# Patient Record
Sex: FEMALE | Race: WHITE | NOT HISPANIC OR LATINO | URBAN - METROPOLITAN AREA
[De-identification: names, ages, dates, MRNs, and addresses within clinical notes are randomized per-mention and may not be internally consistent; named-entity substitution may affect disease eponyms.]

---

## 2019-04-19 ENCOUNTER — IMPORTED ENCOUNTER (OUTPATIENT)
Dept: URBAN - METROPOLITAN AREA CLINIC 1 | Facility: CLINIC | Age: 39
End: 2019-04-19

## 2019-04-19 PROBLEM — H18.832: Noted: 2019-04-19

## 2019-04-19 PROCEDURE — 92002 INTRM OPH EXAM NEW PATIENT: CPT

## 2019-04-19 NOTE — PATIENT DISCUSSION
1.  RES OS: Recommend routine use of ATs TID OS. Recommend gel ATs OS QHS (Sample of Celluvisc). If not resolved recommend use kuldeep ATs QHS OU (Handout given). Consider laser therapy with no resolution. Recommend RTC with any reoccurence. Return for an appointment PRN with Dr. Glynn Walton.

## 2019-04-24 ENCOUNTER — IMPORTED ENCOUNTER (OUTPATIENT)
Dept: URBAN - METROPOLITAN AREA CLINIC 1 | Facility: CLINIC | Age: 39
End: 2019-04-24

## 2019-04-24 PROBLEM — S05.02XA: Noted: 2019-04-24

## 2019-04-24 PROBLEM — H18.832: Noted: 2019-04-24

## 2019-04-24 PROCEDURE — 92012 INTRM OPH EXAM EST PATIENT: CPT

## 2019-04-24 PROCEDURE — 92071 CONTACT LENS FITTING FOR TX: CPT

## 2019-05-07 ENCOUNTER — IMPORTED ENCOUNTER (OUTPATIENT)
Dept: URBAN - METROPOLITAN AREA CLINIC 1 | Facility: CLINIC | Age: 39
End: 2019-05-07

## 2019-05-07 PROBLEM — H18.822: Noted: 2019-05-07

## 2019-05-07 PROCEDURE — 92012 INTRM OPH EXAM EST PATIENT: CPT

## 2019-05-07 NOTE — PATIENT DISCUSSION
Corneal Abrasion OS (Active) : Active Corneal Abrasion OS w/ h/o RES OS -- Bandage CTL was removed today OS by RBF in office. Recommend continue the frequent use of OTC PF ATS [id number]-[id number] OS Routinely. **Consider laser therapy w/ no resolution. Discussed this option with patient today. Return as scheduled tomorrow 5/8/19 for 10 appointment with Dr. Rodney Eric.

## 2019-05-07 NOTE — PATIENT DISCUSSION
1.  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 2. Active Corneal Abrasion OS w/ h/o RES OS -- Resolving. Bandage CTL was removed today OS by RBF in office. Recommend continue the use of PF AT's Q1-2hrs OS. **Consider laser therapy w/ no resolution. Discussed this option with patient today. Return for an appointment as needed with Dr. Shahida English.

## 2019-06-11 ENCOUNTER — IMPORTED ENCOUNTER (OUTPATIENT)
Dept: URBAN - METROPOLITAN AREA CLINIC 1 | Facility: CLINIC | Age: 39
End: 2019-06-11

## 2019-06-11 PROBLEM — S05.02XA: Noted: 2019-06-11

## 2019-06-11 PROCEDURE — 92012 INTRM OPH EXAM EST PATIENT: CPT

## 2019-06-11 NOTE — PATIENT DISCUSSION
1.  Active Corneal Abrasion OS w/ h/o RES OS -- PMG saw today. Resolving Abrasion. Reoccuring every 3-4 days despite routine use of ATs kuldeep QHS OS. Patient admits to not any ATs during the day routinely. Stressed compliance with ATs Q1-2hrs throughout the day. Discussed therapy option with laser if no resolution with compliance. (Sample of both Refresh Liquigel and Refresh Advanced Optive PF given)2. H/o injury OS with corneal membrane separation (March 2013)3. 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 Return for an appointment in PRN with Dr. Manuel Campoverde.

## 2019-06-13 ENCOUNTER — IMPORTED ENCOUNTER (OUTPATIENT)
Dept: URBAN - METROPOLITAN AREA CLINIC 1 | Facility: CLINIC | Age: 39
End: 2019-06-13

## 2019-06-13 PROBLEM — H18.832: Noted: 2019-06-13

## 2019-06-13 PROCEDURE — 92012 INTRM OPH EXAM EST PATIENT: CPT

## 2019-06-13 NOTE — PATIENT DISCUSSION
1.  RES OS w/ H/o K Abrasion OS -- Silicone Plug LLL:  Risks benefits and alternatives to placing plug was discussed with patient. Patient understands and would like to proceed. Consent was signed. Post op instructions were discussed/given to patient and patient was advised to call our office if any problems arose. Place LLL Plug in hopes of improvement. Patient to continue current gtts regimen; ATs Q1-2hrs throughout the day and ATs kuldeep QHS OS. Advise patient to return if still symptomatic despite compliance and LLL plug. 2.  H/o injury OS with corneal membrane separation (March 2013)3. 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 Return for an appointment in 6 months for a 10 / k check with Dr. Danyelle Bojorquez.

## 2019-06-18 ENCOUNTER — IMPORTED ENCOUNTER (OUTPATIENT)
Dept: URBAN - METROPOLITAN AREA CLINIC 1 | Facility: CLINIC | Age: 39
End: 2019-06-18

## 2019-06-18 PROBLEM — H18.832: Noted: 2019-06-18

## 2019-06-18 PROCEDURE — 92012 INTRM OPH EXAM EST PATIENT: CPT

## 2019-06-18 NOTE — PATIENT DISCUSSION
1. RES OS- active. Patient has had multiple recurrences despite ATs lubricating Kuldeep QHS and throughout the night. Patient has been compliant. Punctal plug was placed last visit with recurrent s/p placement. H/o Plug LLL. Continue ATs Q1-2hrs throughout the day w/a continue ATs kuldeep QHS OS. Recommend [address] OS given conservative management has not resolved issue OS. 2.  H/o injury OS with corneal membrane separation (March 2013)3. 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 *** Schedule PTK OS

## 2019-06-21 ENCOUNTER — IMPORTED ENCOUNTER (OUTPATIENT)
Dept: URBAN - METROPOLITAN AREA CLINIC 1 | Facility: CLINIC | Age: 39
End: 2019-06-21

## 2019-06-21 PROBLEM — H18.832: Noted: 2019-06-21

## 2019-06-21 PROCEDURE — 92012 INTRM OPH EXAM EST PATIENT: CPT

## 2019-06-21 NOTE — PATIENT DISCUSSION
1. RES OS- active. Patient has had multiple recurrences despite ATs lubricating Kuldeep QHS and throughout the night. Patient has been compliant. Punctal plug was placed last visit with recurrent s/p placement. H/o Plug LLL. Continue ATs Q1-2hrs throughout the day w/a continue ATs kuldeep QHS OS. Recommend [address] OS given conservative management has not resolved issue OS. 2.  H/o injury OS with corneal membrane separation (March 2013)3. 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 *** Schedule PTK OS Awaiting Workman's Comp to approve [address] OS

## 2019-06-25 ENCOUNTER — IMPORTED ENCOUNTER (OUTPATIENT)
Dept: URBAN - METROPOLITAN AREA CLINIC 1 | Facility: CLINIC | Age: 39
End: 2019-06-25

## 2019-06-25 PROCEDURE — 92012 INTRM OPH EXAM EST PATIENT: CPT

## 2019-06-25 NOTE — PATIENT DISCUSSION
1.  RES OS- active. Patient has had multiple recurrences despite ATs lubricating Kuldeep QHS and throughout the night. Patient has been compliant. Punctal plug was placed last visit with recurrence s/p placement. H/o Plug LLL. Continue ATs Q1-2hrs throughout the day w/a continue ATs kuldeep QHS OS. Recommend [address] OS given conservative management has not resolved issue OS. 2.  H/o injury OS with corneal membrane separation (March 2013)3. 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 *** Schedule PTK OS Awaiting Workman's Comp to approve [address] OS

## 2019-07-01 PROBLEM — H18.832: Noted: 2019-07-01

## 2019-07-29 ENCOUNTER — IMPORTED ENCOUNTER (OUTPATIENT)
Dept: URBAN - METROPOLITAN AREA CLINIC 1 | Facility: CLINIC | Age: 39
End: 2019-07-29

## 2019-07-29 NOTE — PATIENT DISCUSSION
Praneeth Quan for patient to proceed with PTK OS. Written rx given for Valium Ocuflox BID OS Durezol BID OSReturn for an appointment for Return as scheduled [address] OS with Dr. Colette Hudson.

## 2019-08-02 ENCOUNTER — IMPORTED ENCOUNTER (OUTPATIENT)
Dept: URBAN - METROPOLITAN AREA CLINIC 1 | Facility: CLINIC | Age: 39
End: 2019-08-02

## 2019-08-05 ENCOUNTER — IMPORTED ENCOUNTER (OUTPATIENT)
Dept: URBAN - METROPOLITAN AREA CLINIC 1 | Facility: CLINIC | Age: 39
End: 2019-08-05

## 2019-08-05 PROBLEM — Z98.89: Noted: 2019-08-05

## 2019-08-05 NOTE — PATIENT DISCUSSION
POD #3 PTK OS - Doing Well. BCL in place will wait to remove Duezol BID OSOcuflox BID OSTears QID OSReturn for an appointment in wed/thurs po with Dr. Ashlyn Burgos.  (BCL removal)

## 2019-08-07 ENCOUNTER — IMPORTED ENCOUNTER (OUTPATIENT)
Dept: URBAN - METROPOLITAN AREA CLINIC 1 | Facility: CLINIC | Age: 39
End: 2019-08-07

## 2019-08-07 PROBLEM — Z98.89: Noted: 2019-08-07

## 2019-08-07 NOTE — PATIENT DISCUSSION
POD #5 PTK OS - Doing Well. BCL removed OS. Epithelium healing well to center Duezol BID OSOcuflox BID OSTears QID OSReturn for an appointment in 2-3 weeks po with Dr. Terrance Small.

## 2019-08-20 ENCOUNTER — IMPORTED ENCOUNTER (OUTPATIENT)
Dept: URBAN - METROPOLITAN AREA CLINIC 1 | Facility: CLINIC | Age: 39
End: 2019-08-20

## 2019-08-20 PROBLEM — G43.909: Noted: 2019-08-20

## 2019-08-20 PROCEDURE — 92012 INTRM OPH EXAM EST PATIENT: CPT

## 2019-08-20 NOTE — PATIENT DISCUSSION
1.  Ocular Migraine- Reassurance and explanation was given to patient. 2. POW #3 [address] OS - Doing Well. DC Ocuflox BID OS. Cont Durezol BID OS Tears Q2H OSReturn for an appointment for Return as scheduled po with Dr. Eli Corea.

## 2019-09-19 ENCOUNTER — IMPORTED ENCOUNTER (OUTPATIENT)
Dept: URBAN - METROPOLITAN AREA CLINIC 1 | Facility: CLINIC | Age: 39
End: 2019-09-19

## 2019-09-19 PROBLEM — Z09: Noted: 2019-09-19

## 2019-09-19 PROCEDURE — 99024 POSTOP FOLLOW-UP VISIT: CPT

## 2019-09-19 NOTE — PATIENT DISCUSSION
1.  PO PTK OS - doing well. No erosion OS noted. D/c Durezol OS. Increase ATs to Q3hrs OS w/a Use AT Gel kuldeep QHS OS. Return for an appointment in 1 month PO PTK (Repeat MRx OS) with Dr. Quita Bhatti.

## 2019-11-01 ENCOUNTER — IMPORTED ENCOUNTER (OUTPATIENT)
Dept: URBAN - METROPOLITAN AREA CLINIC 1 | Facility: CLINIC | Age: 39
End: 2019-11-01

## 2019-11-01 PROBLEM — Z09: Noted: 2019-11-01

## 2019-11-01 PROCEDURE — 99024 POSTOP FOLLOW-UP VISIT: CPT

## 2019-11-01 NOTE — PATIENT DISCUSSION
1. POM#3 PTK OS with h/o RES OS- doing well. No erosion OS noted. Continue ATs Q3hrs OU w/a. Use AT Gel kuldeep QHS OU. MRx for glasses given (requested by patient). Return for an appointment in 6 mo 30 with Dr. Iwona Mcnamara.

## 2019-12-04 ENCOUNTER — IMPORTED ENCOUNTER (OUTPATIENT)
Dept: URBAN - METROPOLITAN AREA CLINIC 1 | Facility: CLINIC | Age: 39
End: 2019-12-04

## 2019-12-04 PROBLEM — H18.832: Noted: 2019-12-04

## 2019-12-04 PROCEDURE — 92012 INTRM OPH EXAM EST PATIENT: CPT

## 2019-12-04 PROCEDURE — 92071 CONTACT LENS FITTING FOR TX: CPT

## 2019-12-04 NOTE — PATIENT DISCUSSION
1.  RES OS (Plug in Place LLL) -- Active despite s/p PTK OS. Bandage CTL was placed OS during exam today by RBF & Consent signed ([employer] Night & Day: -0.25 / 8.4). Begin Prolensa Qday-BID OS until gone (Sample Given). Patient has had multiple recurrences despite PTK ATs lubricating Kuldeep QHS and throughout the night. Patient has been compliant. Continue the use of OTC PF AT's [id number] OU routinely throughout the day w/a continue ATs kuldeep QHS OS. 2.  H/o Corneal Injury OS with corneal membrane separation (March 2013)3. S/p [address] OS Return for an appointment on Monday 12/9/19 for a 10 (K Check OS) with Dr. Shahida English.

## 2019-12-09 ENCOUNTER — IMPORTED ENCOUNTER (OUTPATIENT)
Dept: URBAN - METROPOLITAN AREA CLINIC 1 | Facility: CLINIC | Age: 39
End: 2019-12-09

## 2019-12-09 PROBLEM — H16.002: Noted: 2019-12-09

## 2019-12-09 PROBLEM — H18.832: Noted: 2019-12-09

## 2019-12-09 PROCEDURE — 92012 INTRM OPH EXAM EST PATIENT: CPT

## 2019-12-09 NOTE — PATIENT DISCUSSION
1.  Corneal ulcer OS: Removed BCL. Begin Besivance Q2H OS. Begin Eyrthromycin kuldeep QHS OS (erx both). Return immediately if ulcer larger or symptoms worsen. 2.  RES OS: (LLL plug in place) Re-Epithelialized. BCL in place removed in office. Recommend PF ATs Q1H OS. 3.  H/o Corneal Injury OS with corneal membrane separation (March 2013)4. S/p [address] [facility] for an appointment in Wednesday 10 with Dr. Michelle Correa.

## 2019-12-09 NOTE — PATIENT DISCUSSION
RES OS: (LLL plug in place) BCL in place removed in office. Recommend PF ATs Q1H OS and AT Darius QHS OS.

## 2019-12-11 ENCOUNTER — IMPORTED ENCOUNTER (OUTPATIENT)
Dept: URBAN - METROPOLITAN AREA CLINIC 1 | Facility: CLINIC | Age: 39
End: 2019-12-11

## 2019-12-11 PROBLEM — H16.002: Noted: 2019-12-11

## 2019-12-11 PROBLEM — H18.832: Noted: 2019-12-11

## 2019-12-11 PROCEDURE — 92012 INTRM OPH EXAM EST PATIENT: CPT

## 2019-12-13 ENCOUNTER — IMPORTED ENCOUNTER (OUTPATIENT)
Dept: URBAN - METROPOLITAN AREA CLINIC 1 | Facility: CLINIC | Age: 39
End: 2019-12-13

## 2019-12-13 PROBLEM — H17.822: Noted: 2019-12-13

## 2019-12-13 PROBLEM — H16.002: Noted: 2019-12-13

## 2019-12-13 PROCEDURE — 92012 INTRM OPH EXAM EST PATIENT: CPT

## 2019-12-13 NOTE — PATIENT DISCUSSION
1.  Corneal Ulcer OS -- Improving. Continue Besivance Q2H OS (samples x2 given) and continue Tobramycin Q2H. (alternating Besivance and Tobramycin gtt Q1h). Continue Eyrthromycin kuldeep QHS OS. Return immediately if ulcer larger or symptoms worsen. Patient to remain out of work until Monday. 2. Peripheral Corneal Scar OS -- Secondary to resolving Corneal Ulcer. 3.  RES OS -- (LLL plug in place) Re-Epithelialized. Cont PF ATs Q1H OS in between Besivance and Tobramycin. 4.  H/o Corneal Injury OS with corneal membrane separation (March 2013)5. S/p [address] [facility] for an appointment Tuesday for a 10 with Dr. Denisha Zabala.

## 2019-12-17 ENCOUNTER — IMPORTED ENCOUNTER (OUTPATIENT)
Dept: URBAN - METROPOLITAN AREA CLINIC 1 | Facility: CLINIC | Age: 39
End: 2019-12-17

## 2019-12-17 PROBLEM — H16.002: Noted: 2019-12-17

## 2019-12-17 PROCEDURE — 92012 INTRM OPH EXAM EST PATIENT: CPT

## 2019-12-17 NOTE — PATIENT DISCUSSION
1.  Corneal Ulcer OS -- Ulcer appears to be worsening despite compliance w/ gtts. Continue Besivance Q2H OS (sample given) and continue Tobramycin Q2H (alternating Besivance and Tobramycin gtt Q1h). Continue Eyrthromycin kuldeep QHS OS. Will refer patient to Corneal specialist (Dr. Marcelino Terry) to be seen within the week. 2.  RES OS -- (LLL plug in place) Re-Epithelialized. Cont PF ATs Q1H OS in between Besivance and Tobramycin. 3.  H/o Corneal Injury OS with corneal membrane separation (March)4. S/p PTK OS (August 2019)Return for an appointment as scheduled in May with Dr. Sanjana Iniguez.

## 2020-05-01 ENCOUNTER — IMPORTED ENCOUNTER (OUTPATIENT)
Dept: URBAN - METROPOLITAN AREA CLINIC 1 | Facility: CLINIC | Age: 40
End: 2020-05-01

## 2020-05-01 PROBLEM — H16.142: Noted: 2020-05-01

## 2020-05-01 PROCEDURE — 92012 INTRM OPH EXAM EST PATIENT: CPT

## 2020-05-01 NOTE — PATIENT DISCUSSION
1. Punctate Keratitis OS -- Non compliance with gtts. Recommend patient to use Genteal kuldeep QHS and ATs TID OS at minimum. Compliance urged. 2.  RES OS w/ h/o PTK (August 2019)  -- (LLL plug in place)3. K scar OS -- H/o Corneal Ulcer OS. Patient referred by Dr. Lynnette Tipton to see Dr. Laverna Kanner. Patient seen by Dr. Laverna Kanner December 2019 who recommended observation without any surgical intervention. 4.  H/o Corneal Injury OS with corneal membrane separation (March 2019)Return for an appointment 1 month for a 30 with Dr. Ramona Pruett.

## 2020-05-01 NOTE — PATIENT DISCUSSION
1.  PEK OS --  Recommend genteal kuldeep QHS and using ATs TID OS. Compliance urged. 2.  RES OS w/ h/o PTK (August 2019)  -- (LLL plug in place)  3.  K scar OS -- H/o Corneal Ulcer OS4. H/o Corneal Injury OS with corneal membrane separation (March)Return for an appointment 1 month for a 30 with Dr. Sofia Sanchez.

## 2020-05-19 ENCOUNTER — IMPORTED ENCOUNTER (OUTPATIENT)
Dept: URBAN - METROPOLITAN AREA CLINIC 1 | Facility: CLINIC | Age: 40
End: 2020-05-19

## 2020-05-19 PROBLEM — H16.142: Noted: 2020-05-19

## 2020-05-19 PROCEDURE — 92012 INTRM OPH EXAM EST PATIENT: CPT

## 2020-05-19 NOTE — PATIENT DISCUSSION
RES OS w/ h/o PTK (August 2019)  - (LLL plug in place)3. K scar OS - H/o Corneal Ulcer OS. Patient referred by Dr. Juaquin Middleton to see Dr. Minh Ellison. Patient seen by Dr. Minh Ellison December 2019 who recommended observation without any surgical intervention.  4.  H/o Corneal Injury OS with corneal membrane separation (March 2019)

## 2020-05-21 ENCOUNTER — IMPORTED ENCOUNTER (OUTPATIENT)
Dept: URBAN - METROPOLITAN AREA CLINIC 1 | Facility: CLINIC | Age: 40
End: 2020-05-21

## 2020-05-21 PROBLEM — S05.02XA: Noted: 2020-05-21

## 2020-05-21 PROBLEM — H18.832: Noted: 2020-05-21

## 2020-05-21 PROCEDURE — 92071 CONTACT LENS FITTING FOR TX: CPT

## 2020-05-21 PROCEDURE — 92012 INTRM OPH EXAM EST PATIENT: CPT

## 2020-05-21 NOTE — PATIENT DISCUSSION
1.  Active Corneal Abrasion OS --  H/o RES OS. Speculum was inserted and a bandage CTL placed OS by PMG today without complications. Begin Ofloxacin Q3h. Cont PF ATs Q1H OS routinely and advised to switch to AT Darius QHS OS. 2. RES OS w/ h/o PTK (August 2019)  -- (LLL plug in place)3. H/o Corneal Injury OS (Cable) with corneal membrane separation (March 2019)4. K scar OS -- H/o Corneal Ulcer OS. Patient seen by Dr. Luzmaria Starks December 2019 who recommended observation without any surgical intervention. Return for an appointment in Tuesday 10 with Dr. Liane Romero.

## 2020-05-26 ENCOUNTER — IMPORTED ENCOUNTER (OUTPATIENT)
Dept: URBAN - METROPOLITAN AREA CLINIC 1 | Facility: CLINIC | Age: 40
End: 2020-05-26

## 2020-05-26 PROBLEM — S05.02XD: Noted: 2020-05-26

## 2020-05-26 PROCEDURE — 99213 OFFICE O/P EST LOW 20 MIN: CPT

## 2020-05-26 NOTE — PATIENT DISCUSSION
H/o RES OS. Ofloxacin Q3h. Cont PF ATs Q1H OS routinely and advised to switch to AT Darius QHS OS. 2. RES OS w/ h/o PTK (August 2019) - (LLL plug in place)3. H/o Corneal Injury OS (Cable) with corneal membrane separation (March 2019)4. Peripheral Corneal scar OS - H/o Corneal Ulcer OS. Patient seen by Dr. Rodas Cord December 2019 who recommended observation without any surgical intervention.

## 2020-05-26 NOTE — PATIENT DISCUSSION
1.  Corneal Abrasion OS (Subsequent) : Much improved. BCL in place will remove at next visit. H/o RES OS. Decrease Ofloxacin BID OS. Cont PF ATs Q1H OS routinely and AT Darius QHS OS. 2. RES OS w/ h/o PTK (August 2019) - (LLL plug in place)3. H/o Corneal Injury OS (Cable) with corneal membrane separation (March 2019)4. Peripheral Corneal scar OS - H/o Corneal Ulcer OS. Patient seen by Dr. Combs Gains December 2019 who recommended observation without any surgical intervention. Return for an appointment in Thursday afternoon 10 (remove BCL OS) with Dr. Minh Starks.

## 2020-05-28 ENCOUNTER — IMPORTED ENCOUNTER (OUTPATIENT)
Dept: URBAN - METROPOLITAN AREA CLINIC 1 | Facility: CLINIC | Age: 40
End: 2020-05-28

## 2020-05-28 PROBLEM — S05.02XD: Noted: 2020-05-28

## 2020-05-28 PROCEDURE — 99213 OFFICE O/P EST LOW 20 MIN: CPT

## 2020-05-28 NOTE — PATIENT DISCUSSION
1.  Corneal Abrasion OS (Subsequent) : Resolved. BCL in place removed today without complications. H/o RES OS. DC Ofloxacin. Strongly advised the importance of PF ATs Q1H OS routinely and AT Darius QHS OS. 2. RES OS w/ h/o PTK (August 2019) - (LLL plug in place)3. H/o Corneal Injury OS (Cable) with corneal membrane separation (March 2019)4. Peripheral Corneal scar OS - H/o Corneal Ulcer OS. Patient seen by Dr. Tere Farias December 2019 who recommended observation without any surgical intervention. Return for an appointment in August 30 with Dr. Aleksandr Garcia.

## 2020-08-20 ENCOUNTER — IMPORTED ENCOUNTER (OUTPATIENT)
Dept: URBAN - METROPOLITAN AREA CLINIC 1 | Facility: CLINIC | Age: 40
End: 2020-08-20

## 2020-08-20 PROBLEM — H18.832: Noted: 2020-08-20

## 2020-08-20 PROCEDURE — 92014 COMPRE OPH EXAM EST PT 1/>: CPT

## 2020-08-20 PROCEDURE — 92015 DETERMINE REFRACTIVE STATE: CPT

## 2020-08-20 NOTE — PATIENT DISCUSSION
1.  RES OS w/ h/o PTK (August 2019) LLL plug in place. Controlled. Recommend cont the use of PF ATs Q1-2h and Darius QHS. 2.  H/o Corneal Injury OS - (Cable) with corneal membrane separation (March 2019)3. Peripheral Corneal Scar OS - H/o Corneal Ulcer OS. Patient seen by Dr. Enzo Figueredo December 2019 who recommended observation without any surgical intervention. Return for an appointment in 1 year for a 30 with Dr. Sofia Sanchez.

## 2020-10-02 ENCOUNTER — IMPORTED ENCOUNTER (OUTPATIENT)
Dept: URBAN - METROPOLITAN AREA CLINIC 1 | Facility: CLINIC | Age: 40
End: 2020-10-02

## 2020-10-02 NOTE — PATIENT DISCUSSION
5.6VE Silicone Plug LLL:  Risks benefits and alternatives to placing plug was discussed with patient. Patient understands and would like to proceed. Consent was signed. Post op instructions were discussed/given to patient and patient was advised to call our office if any problems arose. Return for an appointment in August 30 with Dr. Jose Antonio Fischer.

## 2020-11-03 ENCOUNTER — IMPORTED ENCOUNTER (OUTPATIENT)
Dept: URBAN - METROPOLITAN AREA CLINIC 1 | Facility: CLINIC | Age: 40
End: 2020-11-03

## 2020-11-03 PROCEDURE — 92012 INTRM OPH EXAM EST PATIENT: CPT

## 2020-11-03 NOTE — PATIENT DISCUSSION
1.  RES OS w/ h/o PTK (August 2019) LLL plug in place. No active Abrasion seen today only PEK overlying RES site. Will change PM kuldeep to [place] QHS. Recommend cont the use of PF ATs Q1-2h increasing with computer use. 2. H/o Corneal Injury OS -- Liana Wallis) with corneal membrane separation (March 2019)3. Peripheral Corneal Scar x2 OS -- H/o Corneal Ulcer OS. Patient seen by Dr. Tere Farias December 2019 who recommended observation without any surgical intervention. Return for an appointment As scheduled with Dr. Aleksandr Garcia.

## 2021-02-09 ENCOUNTER — IMPORTED ENCOUNTER (OUTPATIENT)
Dept: URBAN - METROPOLITAN AREA CLINIC 1 | Facility: CLINIC | Age: 41
End: 2021-02-09

## 2021-02-09 PROBLEM — S05.02XA: Noted: 2021-02-09

## 2021-02-09 PROBLEM — H18.832: Noted: 2021-02-09

## 2021-02-09 PROCEDURE — 92071 CONTACT LENS FITTING FOR TX: CPT

## 2021-02-09 PROCEDURE — 99214 OFFICE O/P EST MOD 30 MIN: CPT

## 2021-02-09 NOTE — PATIENT DISCUSSION
1.  RES OS with active Abrasion today -- BCL Placed OS today without complications. Begin Ofloxacin TID OS (erx'd). Continue PF ATs Q1-2h and Mariza 128 kuldeep/PM gel QHS OS. **Consider CTL fit OS to use PRN after abrasion heals. 2.  H/o Corneal Injury OS -- Bartholome Ignacio) with corneal membrane separation (March 2019)3. Peripheral Corneal Scar x2 OS -- H/o Corneal Ulcer OS. Patient seen by Dr. Combs Gains December 2019 who recommended observation without any surgical intervention. 4. H/o PTK OS (August 2019) Return for an appointment Thursday for a 10 with Dr. Minh Starks.

## 2021-02-11 ENCOUNTER — IMPORTED ENCOUNTER (OUTPATIENT)
Dept: URBAN - METROPOLITAN AREA CLINIC 1 | Facility: CLINIC | Age: 41
End: 2021-02-11

## 2021-02-11 PROBLEM — H18.832: Noted: 2021-02-11

## 2021-02-11 PROCEDURE — 99213 OFFICE O/P EST LOW 20 MIN: CPT

## 2021-02-11 NOTE — PATIENT DISCUSSION
H/o Corneal Injury OS - (Cable) with corneal membrane separation (March 2019)3. Peripheral Corneal Scar x2 OS - H/o Corneal Ulcer OS. Patient seen by Dr. Nikos Stone December 2019 who recommended observation without any surgical intervention. 4.   H/o PTK OS (August 2019)

## 2021-02-11 NOTE — PATIENT DISCUSSION
1.  RES OS: Much improved. BCL in place. Will remove at next visit. Continue Ofloxacin TID OS. Continue PF ATs Q1-2h and Mariza 128 kuldeep/PM gel QHS OS. **Consider CTL fit OS for Daily CTLs to use PRN when she feels abrasion is coming on.2. H/o Corneal Injury OS - (Cable) with corneal membrane separation (March 2019)3. Peripheral Corneal Scar x2 OS - H/o Corneal Ulcer OS. Patient seen by Dr. Atwood Iron December 2019 who recommended observation without any surgical intervention. 4. H/o PTK OS (August 2019)Return for an appointment in Tuesday 10 with Dr. Parveen Sanchez. Return for an appointment in 3 weeks CTL I&R (Daily CTL) with Dr. Linda Mcelroy. (Alvarez Total One -0.50 Sph)

## 2021-02-16 ENCOUNTER — IMPORTED ENCOUNTER (OUTPATIENT)
Dept: URBAN - METROPOLITAN AREA CLINIC 1 | Facility: CLINIC | Age: 41
End: 2021-02-16

## 2021-02-16 PROBLEM — H18.832: Noted: 2021-02-16

## 2021-02-16 PROCEDURE — 99213 OFFICE O/P EST LOW 20 MIN: CPT

## 2021-02-16 NOTE — PATIENT DISCUSSION
1.  RES OS -- Resolved. BCL removed today without complications. D/c Ofloxacin. Continue PF ATs Q1-2h and Mariza 128 kuldeep/PM gel QHS OS. **Patient to return for CTL fit OS for daily CTLs to use PRN when she feels abrasion is coming on.2. H/o Corneal Injury OS -- Danii Carlos) with corneal membrane separation (March 2019)3. Peripheral Corneal Scar x2 OS -- H/o Corneal Ulcer OS. Patient seen by Dr. Minh Ellison December 2019 who recommended observation without any surgical intervention. 4.   H/o [address] OS (August 2019) Return for an appointment in As schedule CTL Teach I&R with Optical  Patient to follow up as CTL fit (Daieugeniees Total One -0.25 Sph)

## 2021-03-15 ENCOUNTER — IMPORTED ENCOUNTER (OUTPATIENT)
Dept: URBAN - METROPOLITAN AREA CLINIC 1 | Facility: CLINIC | Age: 41
End: 2021-03-15

## 2021-03-15 PROBLEM — H18.832: Noted: 2021-03-15

## 2021-03-15 PROCEDURE — 92012 INTRM OPH EXAM EST PATIENT: CPT

## 2021-03-15 NOTE — PATIENT DISCUSSION
1. 1.RES OS:  Start Ocuflox qid os until contact lens removed in the am keep contact lens in today and remove tomorrow morning to give small abrasion time to heal.  Ok for pt to use DTO contact lens nightly. [id number] Alona Archer for pt to return to work tomorrow. 2.  H/o Corneal Injury OS -- Joel Freed) with corneal membrane separation (March 2019)3. Peripheral Corneal Scar x2 OS -- H/o Corneal Ulcer OS. Patient seen by Dr. Clint Galeazzi December 2019 who recommended observation without any surgical intervention. 4. H/o PTK OS (August 2019) 5. Return for an appointment for Keep appt with Dr. Glynn Walton.

## 2021-05-11 ENCOUNTER — IMPORTED ENCOUNTER (OUTPATIENT)
Dept: URBAN - METROPOLITAN AREA CLINIC 1 | Facility: CLINIC | Age: 41
End: 2021-05-11

## 2021-05-11 PROBLEM — H18.832: Noted: 2021-05-11

## 2021-05-11 PROCEDURE — 92012 INTRM OPH EXAM EST PATIENT: CPT

## 2021-05-11 NOTE — PATIENT DISCUSSION
1.  RES OS -- Symptoms controlled with BCL. Use Ocuflox TID OS. Return Thursday. 2.  H/o Corneal Injury OS -- Glenn Dunk) with corneal membrane separation (March 2019)3. Peripheral Corneal Scar x2 OS -- H/o Corneal Ulcer OS. Patient seen by Dr. Reuben Briggs December 2019 who recommended observation without any surgical intervention. 4. H/o PTK OS -- (August 2019) Return for an appointment in Thursday for 10/recheck OS with Dr. Eli Corea.

## 2021-05-13 ENCOUNTER — IMPORTED ENCOUNTER (OUTPATIENT)
Dept: URBAN - METROPOLITAN AREA CLINIC 1 | Facility: CLINIC | Age: 41
End: 2021-05-13

## 2021-05-13 PROBLEM — H18.832: Noted: 2021-05-13

## 2021-05-13 PROCEDURE — 99024 POSTOP FOLLOW-UP VISIT: CPT

## 2021-05-13 NOTE — PATIENT DISCUSSION
1.  RES OS -- Small abrasion/stain at previous site. New CTL placed today without complications. Patient instructed to use Ocuflox TID OS. Recommend using OTC ATs Q1-2h. 2.  H/o Corneal Injury OS -- (Cable) with corneal membrane separation (March 2019)3. Peripheral Corneal Scar x2 OS -- H/o Corneal Ulcer OS. Patient seen by Dr. Juliana Pool December 2019 who recommended observation without any surgical intervention. 4. H/o PTK OS -- (August 2019) Return for an appointment in Tuesday afternoon for 10/recheck OS with Dr. Delmy Brown.

## 2021-06-29 ENCOUNTER — IMPORTED ENCOUNTER (OUTPATIENT)
Dept: URBAN - METROPOLITAN AREA CLINIC 1 | Facility: CLINIC | Age: 41
End: 2021-06-29

## 2021-06-29 PROBLEM — H18.832: Noted: 2021-06-29

## 2021-06-29 PROCEDURE — 92012 INTRM OPH EXAM EST PATIENT: CPT

## 2021-06-29 NOTE — PATIENT DISCUSSION
1.  Corneal foreign body OS -- Resolved after BCL removed in office. May be cause of pt irritation. 2.  RES OS -- Controlled with BCL QHS and routine AT use however pt missed Sunday instilling in BCL and had another episod. No abrasion or ulcer seen today however will begin Tobradex ST BID OS (sample given). Use kuldeep QHS OS. Stay out of CTL OS until resolved. Cont OTC ATs Q1-2h. 3.  H/o Corneal Injury OS -- (Cable) with corneal membrane separation (March 2019)4. Peripheral Corneal Scar x2 OS -- H/o Corneal Ulcer OS. Patient seen by Dr. Combs Gains December 2019 who recommended observation without any surgical intervention. 5. H/o PTK OS -- (August 2019) Return for an appointment as scheduled with Dr. Minh Starks.

## 2021-08-02 ENCOUNTER — IMPORTED ENCOUNTER (OUTPATIENT)
Dept: URBAN - METROPOLITAN AREA CLINIC 1 | Facility: CLINIC | Age: 41
End: 2021-08-02

## 2021-08-02 PROBLEM — H17.822: Noted: 2021-08-02

## 2021-08-02 PROBLEM — H18.832: Noted: 2021-08-02

## 2021-08-02 PROCEDURE — 92014 COMPRE OPH EXAM EST PT 1/>: CPT

## 2021-08-02 NOTE — PATIENT DISCUSSION
1.  RES OS: Controlled with BCL QHS and routine AT use. This regiment is expected for life. 2.  Peripheral Corneal Scar x2 OS - H/o Corneal Ulcer OS. Patient seen by Dr. Arlen Alcazar December 2019 who recommended observation without any surgical intervention. 3. H/o Corneal Injury OS - (Cable) with corneal membrane separation (March 2019)4. H/o PTK OS - (August 2019)Return for an appointment in 6 months 10 with Dr. Jose Antonio Fischer.

## 2021-08-02 NOTE — PATIENT DISCUSSION
Peripheral Corneal Scar x2 OS - H/o Corneal Ulcer OS. Patient seen by Dr. Combs Gains December 2019 who recommended observation without any surgical intervention. 3. H/o Corneal Injury OS - (Cable) with corneal membrane separation (March 2019)4.   H/o PTK OS - (August 2019)

## 2021-12-08 ENCOUNTER — IMPORTED ENCOUNTER (OUTPATIENT)
Dept: URBAN - METROPOLITAN AREA CLINIC 1 | Facility: CLINIC | Age: 41
End: 2021-12-08

## 2021-12-08 PROBLEM — H16.012: Noted: 2021-12-08

## 2021-12-08 PROCEDURE — 92012 INTRM OPH EXAM EST PATIENT: CPT

## 2021-12-08 NOTE — PATIENT DISCUSSION
1.  Corneal Ulcer OS -- Begin Ofloxacin Q2Hrs OS & alternating Besivance Q2Hrs OS (Sample given). Return immediately if ulcer larger or symptoms worsen. *Pt may use BCL QHS Only 2. Recurrent Erosion Symdrome OS -- Controlled with BCL QHS and routine AT use. This regiment is expected for life. 3.  Peripheral Corneal Scar x2 OS -- H/o Corneal Ulcer OS. Patient seen by Dr. Atwood Iron December 2019 who recommended observation without any surgical intervention. 4. H/o Corneal Injury OS -- Marcia Riding) with corneal membrane separation (March 2019)5. H/o [address] OS (August 2019)Return for an appointment on Thursday 10/Check K with Dr. Eugenia Baker. Return for an appointment as scheduled (2.8.22 - 30) with Dr. Parveen Sanchez.

## 2021-12-08 NOTE — PATIENT DISCUSSION
Corneal ulcer OS: Return immediately if ulcer larger or symptoms worsen. No contact lens use. Risks/benefits discussed with patient/surrogate Risks/benefits discussed with patient/surrogate/Vaccine Information Sheet (VIS) provided-VIS date: 8/6/21

## 2021-12-09 ENCOUNTER — IMPORTED ENCOUNTER (OUTPATIENT)
Dept: URBAN - METROPOLITAN AREA CLINIC 1 | Facility: CLINIC | Age: 41
End: 2021-12-09

## 2021-12-09 PROBLEM — H16.002: Noted: 2021-12-09

## 2021-12-09 PROCEDURE — 92012 INTRM OPH EXAM EST PATIENT: CPT

## 2021-12-09 NOTE — PATIENT DISCUSSION
1.  Corneal Ulcer OS -- Cont Ofloxacin Q2Hrs OS while awake. Begin Tobramycin TID OS (Erx'd). Begin Erythromycin Darius QHS OS (Erx'd). D/c Besivance. Return immediately if ulcer larger or symptoms worsen. *Pt may use BCL QHS Only. 2.  Recurrent Erosion Symdrome OS -- Controlled with BCL QHS and routine AT use. This regiment is expected for life. 3.  Peripheral Corneal Scar x2 OS -- H/o Corneal Ulcer OS. Patient seen by Dr. Blanquita Ramírez December 2019 who recommended observation without any surgical intervention. 4. H/o Corneal Injury OS -- Chrystie Bassem) with corneal membrane separation (March 2019)5. H/o PTK OS (August 2019)Return for an appointment on Tuesday 10/Check K with Dr. Monik Scott.

## 2021-12-14 ENCOUNTER — IMPORTED ENCOUNTER (OUTPATIENT)
Dept: URBAN - METROPOLITAN AREA CLINIC 1 | Facility: CLINIC | Age: 41
End: 2021-12-14

## 2021-12-14 ENCOUNTER — PREPPED CHART (OUTPATIENT)
Dept: URBAN - METROPOLITAN AREA CLINIC 1 | Facility: CLINIC | Age: 41
End: 2021-12-14

## 2021-12-14 PROBLEM — H16.002: Noted: 2021-12-14

## 2021-12-14 PROCEDURE — 92012 INTRM OPH EXAM EST PATIENT: CPT

## 2021-12-14 NOTE — PATIENT DISCUSSION
x H/o corneal ulcer OS. Patient seen by Dr. Clint Galeazzi December 2019 who recommended observation w/o any surgical intervention.

## 2021-12-14 NOTE — PATIENT DISCUSSION
resolved, now K scar. Decrease Ocuflox to BID OS until Friday then D/c. Decrease Tobramycin to BID OS until Friday then D/c. D/c Erythromycin kuldeep QHS OU.

## 2021-12-14 NOTE — PATIENT DISCUSSION
1.  Corneal Ulcer OS -- Resolved now K scar. Decrease Ofloxacin to BID OS until Friday then D/c. Decrease Tobramycin to BID OS until Friday then d/c. D/c Erythromycin Darius QHS OS. Return immediately if ulcer larger or symptoms worsen. *Pt may use BCL QHS Only. 2.  Recurrent Erosion Symdrome OS -- Controlled with BCL QHS and routine AT use. This regiment is expected for life. 3.  Peripheral Corneal Scar x2 OS -- H/o Corneal Ulcer OS. Patient seen by Dr. Reuben Briggs December 2019 who recommended observation without any surgical intervention. 4. H/o Corneal Injury OS -- Glenn Dunk) with corneal membrane separation (March 2019)5. H/o PTK OS (August 2019)Return for an appointment as scheduled with Dr. Eli Corea.

## 2022-02-07 ASSESSMENT — TONOMETRY
OS_IOP_MMHG: 16
OD_IOP_MMHG: 16

## 2022-02-07 ASSESSMENT — VISUAL ACUITY
OD_SC: 20/20
OS_SC: 20/25

## 2022-03-26 ENCOUNTER — EMERGENCY VISIT (OUTPATIENT)
Dept: URBAN - METROPOLITAN AREA CLINIC 1 | Facility: CLINIC | Age: 42
End: 2022-03-26

## 2022-03-26 DIAGNOSIS — H18.832: ICD-10-CM

## 2022-03-26 PROCEDURE — 92012 INTRM OPH EXAM EST PATIENT: CPT

## 2022-03-26 NOTE — PATIENT DISCUSSION
Patient currently using DT1 as BCL, changed BCL to Infuse (-0.25) due to patient stating intense dryness. Patient to take DT1 lens out and replace with new Infuse BCL on Wednesday.

## 2022-03-26 NOTE — PATIENT DISCUSSION
Begin Moxi TID OS (sample given to patient) . Removed previous BCL and replaced lens in office with Acuvue Oasys (-0.50) Before placing BCL instilled Proparacaine, Fluress, and Moxi D/c Oflox BID OS and D/c Tobramycin BID OS.  Advised on increasing Vitamin C 2gm PO QD.

## 2022-03-26 NOTE — PATIENT DISCUSSION
x H/o corneal ulcer OS. Patient seen by Dr. Minh Ellison December 2019 who recommended observation w/o any surgical intervention.

## 2022-03-28 ENCOUNTER — FOLLOW UP (OUTPATIENT)
Dept: URBAN - METROPOLITAN AREA CLINIC 1 | Facility: CLINIC | Age: 42
End: 2022-03-28

## 2022-03-28 DIAGNOSIS — H18.832: ICD-10-CM

## 2022-03-28 DIAGNOSIS — H17.9: ICD-10-CM

## 2022-03-28 PROCEDURE — 92012 INTRM OPH EXAM EST PATIENT: CPT

## 2022-03-28 ASSESSMENT — VISUAL ACUITY
OS_SC: 20/30
OD_SC: 20/20

## 2022-03-28 NOTE — PATIENT DISCUSSION
Continue with BCL QHS x5 days/1 week then follow up with PMG and routinely use ATs Q1-2 hrs (sample of refresh pf given). The regiment is expected for life.

## 2022-03-30 ASSESSMENT — VISUAL ACUITY
OS_SC: 20/60
OS_PH: 20/30 +1

## 2022-04-01 ENCOUNTER — FOLLOW UP (OUTPATIENT)
Dept: URBAN - METROPOLITAN AREA CLINIC 1 | Facility: CLINIC | Age: 42
End: 2022-04-01

## 2022-04-01 DIAGNOSIS — H18.832: ICD-10-CM

## 2022-04-01 DIAGNOSIS — H17.9: ICD-10-CM

## 2022-04-01 PROCEDURE — 92012 INTRM OPH EXAM EST PATIENT: CPT

## 2022-04-01 ASSESSMENT — VISUAL ACUITY
OS_SC: 20/20
OD_SC: 20/20

## 2022-04-01 NOTE — PATIENT DISCUSSION
x H/o corneal ulcer OS. Patient seen by Dr. Combs Gains December 2019 who recommended observation w/o any surgical intervention.

## 2022-04-02 ASSESSMENT — VISUAL ACUITY
OS_PH: SC 20/30
OD_CC: 20/20
OS_CC: 20/20
OD_CC: 20/20
OD_CC: 20/20
OS_CC: 20/20
OD_CC: 20/20
OD_CC: 20/20
OS_CC: 20/40
OD_CC: 20/20
OS_CC: 20/50
OS_CC: 20/40
OS_CC: 20/20
OS_CC: 20/25
OD_CC: 20/20
OS_CC: 20/400
OD_CC: 20/20
OS_CC: 20/30
OS_CC: 20/30
OD_CC: 20/20
OS_CC: 20/40-2
OS_CC: 20/25-1
OD_CC: 20/20
OS_CC: 20/20-1
OD_CC: 20/20
OS_PH: SC 20/25 +1
OS_CC: 20/30
OS_CC: 20/20
OD_CC: 20/20
OS_PH: SC 20/25
OD_CC: 20/20
OS_CC: 20/20
OD_CC: 20/20
OS_CC: 20/20
OS_CC: 20/20
OD_CC: 20/20
OS_CC: 20/25+2
OD_CC: 20/25
OS_CC: 20/25
OD_CC: 20/20
OD_CC: 20/20
OS_PH: SC 20/50
OS_CC: 20/20
OD_CC: 20/20
OS_CC: 20/30
OD_CC: 20/20
OS_CC: 20/20
OS_CC: 20/100
OD_CC: 20/20
OS_PH: SC 20/20
OD_CC: 20/20
OS_CC: 20/20
OS_CC: 20/20
OD_CC: 20/20
OS_CC: 20/50
OS_CC: 20/25-1
OS_CC: 20/400
OS_CC: 20/20
OD_CC: 20/20
OS_CC: 20/40
OD_CC: 20/20
OS_CC: 20/100
OS_CC: 20/30
OD_CC: 20/20
OS_PH: SC 20/20
OS_CC: 20/60
OD_CC: 20/20

## 2022-04-02 ASSESSMENT — TONOMETRY
OD_IOP_MMHG: 16
OS_IOP_MMHG: 20
OD_IOP_MMHG: 15
OS_IOP_MMHG: 16
OD_IOP_MMHG: 20
OD_IOP_MMHG: 16
OS_IOP_MMHG: 15
OD_IOP_MMHG: 15
OS_IOP_MMHG: 15
OD_IOP_MMHG: 15
OD_IOP_MMHG: 17
OD_IOP_MMHG: 16
OD_IOP_MMHG: 15
OD_IOP_MMHG: 15
OS_IOP_MMHG: 19
OS_IOP_MMHG: 16
OD_IOP_MMHG: 20
OD_IOP_MMHG: 14
OS_IOP_MMHG: 15

## 2022-05-02 ENCOUNTER — EMERGENCY VISIT (OUTPATIENT)
Dept: URBAN - METROPOLITAN AREA CLINIC 1 | Facility: CLINIC | Age: 42
End: 2022-05-02

## 2022-05-02 DIAGNOSIS — H18.832: ICD-10-CM

## 2022-05-02 DIAGNOSIS — H17.9: ICD-10-CM

## 2022-05-02 PROCEDURE — 92012 INTRM OPH EXAM EST PATIENT: CPT

## 2022-05-02 ASSESSMENT — VISUAL ACUITY
OD_SC: 20/20
OS_SC: 20/20-1

## 2022-05-02 NOTE — PATIENT DISCUSSION
Restart Oflox BID OS x 5 days (erx'd). Sending in Tobramycin also, due to patient going out of town just incase another episode occurs. Will leave BCL out today.

## 2022-10-28 NOTE — PATIENT DISCUSSION
1. Punctate Keratitis OS - Advised to use PF ATs Q1H OS routinely and advised to switch to AT Darius QHS OS. 2. RES OS w/ h/o PTK (August 2019)  - (LLL plug in place)3. K scar OS - H/o Corneal Ulcer OS. Patient seen by Dr. Combs Gains December 2019 who recommended observation without any surgical intervention. 4.  H/o Corneal Injury OS with corneal membrane separation (March 2019)Return for an appointment in Friday 10 with Dr. Minh Starks. No

## 2023-08-04 ENCOUNTER — EMERGENCY VISIT (OUTPATIENT)
Dept: URBAN - METROPOLITAN AREA CLINIC 2 | Facility: CLINIC | Age: 43
End: 2023-08-04

## 2023-08-04 DIAGNOSIS — H18.832: ICD-10-CM

## 2023-08-04 PROCEDURE — 92012 INTRM OPH EXAM EST PATIENT: CPT

## 2023-08-04 ASSESSMENT — VISUAL ACUITY
OD_SC: 20/25
OS_SC: 20/40-2

## 2024-12-19 NOTE — PATIENT DISCUSSION
1.  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 Advise patient to leave in Bandage CTL until next appointment. Recommend the use of PF ATS Q1-2hrs OS. (Sample of PF Systane Ultra given) **Consider laser therapy w/ no resolution. Discussed this option with patient today. Return for an appointment in 2 weeks for a 10 with Dr. Rodney Eric. Patient Education     RSV and shingrix vaccines should be completed at pharmacy.    Preventive Care Advice   This is general advice given by our system to help you stay healthy. However, your care team may have specific advice just for you. Please talk to your care team about your preventive care needs.  Nutrition  Eat 5 or more servings of fruits and vegetables each day.  Try wheat bread, brown rice and whole grain pasta (instead of white bread, rice, and pasta).  Get enough calcium and vitamin D. Check the label on foods and aim for 100% of the RDA (recommended daily allowance).  Lifestyle  Exercise at least 150 minutes each week  (30 minutes a day, 5 days a week).  Do muscle strengthening activities 2 days a week. These help control your weight and prevent disease.  No smoking.  Wear sunscreen to prevent skin cancer.  Have a dental exam and cleaning every 6 months.  Yearly exams  See your health care team every year to talk about:  Any changes in your health.  Any medicines your care team has prescribed.  Preventive care, family planning, and ways to prevent chronic diseases.  Shots (vaccines)   HPV shots (up to age 26), if you've never had them before.  Hepatitis B shots (up to age 59), if you've never had them before.  COVID-19 shot: Get this shot when it's due.  Flu shot: Get a flu shot every year.  Tetanus shot: Get a tetanus shot every 10 years.  Pneumococcal, hepatitis A, and RSV shots: Ask your care team if you need these based on your risk.  Shingles shot (for age 50 and up)  General health tests  Diabetes screening:  Starting at age 35, Get screened for diabetes at least every 3 years.  If you are younger than age 35, ask your care team if you should be screened for diabetes.  Cholesterol test: At age 39, start having a cholesterol test every 5 years, or more often if advised.  Bone density scan (DEXA): At age 50, ask your care team if you should have this scan for osteoporosis (brittle  bones).  Hepatitis C: Get tested at least once in your life.  STIs (sexually transmitted infections)  Before age 24: Ask your care team if you should be screened for STIs.  After age 24: Get screened for STIs if you're at risk. You are at risk for STIs (including HIV) if:  You are sexually active with more than one person.  You don't use condoms every time.  You or a partner was diagnosed with a sexually transmitted infection.  If you are at risk for HIV, ask about PrEP medicine to prevent HIV.  Get tested for HIV at least once in your life, whether you are at risk for HIV or not.  Cancer screening tests  Cervical cancer screening: If you have a cervix, begin getting regular cervical cancer screening tests starting at age 21.  Breast cancer scan (mammogram): If you've ever had breasts, begin having regular mammograms starting at age 40. This is a scan to check for breast cancer.  Colon cancer screening: It is important to start screening for colon cancer at age 45.  Have a colonoscopy test every 10 years (or more often if you're at risk) Or, ask your provider about stool tests like a FIT test every year or Cologuard test every 3 years.  To learn more about your testing options, visit:   .  For help making a decision, visit:   https://bit.ly/li58789.  Prostate cancer screening test: If you have a prostate, ask your care team if a prostate cancer screening test (PSA) at age 55 is right for you.  Lung cancer screening: If you are a current or former smoker ages 50 to 80, ask your care team if ongoing lung cancer screenings are right for you.  For informational purposes only. Not to replace the advice of your health care provider. Copyright   2023 [employer]. All rights reserved. Clinically reviewed by the [facility] Transitions Program. [employer] [id number] - REV 01/24.  Hearing Loss: Care Instructions  Overview     Hearing loss is a sudden or slow decrease in how well you hear. It can range from  slight to profound. Permanent hearing loss can occur with aging. It also can happen when you are exposed long-term to loud noise. Examples include listening to loud music, riding motorcycles, or being around other loud machines.  Hearing loss can affect your work and home life. It can make you feel lonely or depressed. You may feel that you have lost your independence. But hearing aids and other devices can help you hear better and feel connected to others.  Follow-up care is a key part of your treatment and safety. Be sure to make and go to all appointments, and call your doctor if you are having problems. It's also a good idea to know your test results and keep a list of the medicines you take.  How can you care for yourself at home?  Avoid loud noises whenever possible. This helps keep your hearing from getting worse.  Always wear hearing protection around loud noises.  Wear a hearing aid as directed.  A professional can help you pick a hearing aid that will work best for you.  You can also get hearing aids over the counter for mild to moderate hearing loss.  Have hearing tests as your doctor suggests. They can show whether your hearing has changed. Your hearing aid may need to be adjusted.  Use other devices as needed. These may include:  Telephone amplifiers and hearing aids that can connect to a television, stereo, radio, or microphone.  Devices that use lights or vibrations. These alert you to the doorbell, a ringing telephone, or a baby monitor.  Television closed-captioning. This shows the words at the bottom of the screen. Most new TVs can do this.  TTY (text telephone). This lets you type messages back and forth on the telephone instead of talking or listening. These devices are also called TDD. When messages are typed on the keyboard, they are sent over the phone line to a receiving TTY. The message is shown on a monitor.  Use text messaging, social media, and email if it is hard for you to communicate  "by telephone.  Try to learn a listening technique called speechreading. It is not lipreading. You pay attention to people's gestures, expressions, posture, and tone of voice. These clues can help you understand what a person is saying. Face the person you are talking to, and have them face you. Make sure the lighting is good. You need to see the other person's face clearly.  Think about counseling if you need help to adjust to your hearing loss.  When should you call for help?  Watch closely for changes in your health, and be sure to contact your doctor if:    You think your hearing is getting worse.     You have new symptoms, such as dizziness or nausea.   Where can you learn more?  Go to https://www.[employer] [facility] [employer].net/patiented  Enter R798 in the search box to learn more about \"Hearing Loss: Care Instructions.\"  Current as of: September 27, 2023  Content Version: 14.3    2024 [employer].   Care instructions adapted under license by your healthcare professional. If you have questions about a medical condition or this instruction, always ask your healthcare professional. [employer] disclaims any warranty or liability for your use of this information.    Learning About Stress  What is stress?     Stress is your body's response to a hard situation. Your body can have a physical, emotional, or mental response. Stress is a fact of life for most people, and it affects everyone differently. What causes stress for you may not be stressful for someone else.  A lot of things can cause stress. You may feel stress when you go on a job interview, take a test, or run a race. This kind of short-term stress is normal and even useful. It can help you if you need to work hard or react quickly. For example, stress can help you finish an important job on time.  Long-term stress is caused by ongoing stressful situations or events. Examples of long-term stress include long-term health problems, ongoing problems at " work, or conflicts in your family. Long-term stress can harm your health.  How does stress affect your health?  When you are stressed, your body responds as though you are in danger. It makes hormones that speed up your heart, make you breathe faster, and give you a burst of energy. This is called the fight-or-flight stress response. If the stress is over quickly, your body goes back to normal and no harm is done.  But if stress happens too often or lasts too long, it can have bad effects. Long-term stress can make you more likely to get sick, and it can make symptoms of some diseases worse. If you tense up when you are stressed, you may develop neck, shoulder, or low back pain. Stress is linked to high blood pressure and heart disease.  Stress also harms your emotional health. It can make you du, tense, or depressed. Your relationships may suffer, and you may not do well at work or school.  What can you do to manage stress?  You can try these things to help manage stress:   Do something active. Exercise or activity can help reduce stress. Walking is a great way to get started. Even everyday activities such as housecleaning or yard work can help.  Try yoga or danilo chi. These techniques combine exercise and meditation. You may need some training at first to learn them.  Do something you enjoy. For example, listen to music or go to a movie. Practice your hobby or do volunteer work.  Meditate. This can help you relax, because you are not worrying about what happened before or what may happen in the future.  Do guided imagery. Imagine yourself in any setting that helps you feel calm. You can use online videos, books, or a teacher to guide you.  Do breathing exercises. For example:  From a standing position, bend forward from the waist with your knees slightly bent. Let your arms dangle close to the floor.  Breathe in slowly and deeply as you return to a standing position. Roll up slowly and lift your head last.  Hold  "your breath for just a few seconds in the standing position.  Breathe out slowly and bend forward from the waist.  Let your feelings out. Talk, laugh, cry, and express anger when you need to. Talking with supportive friends or family, a counselor, or a shira leader about your feelings is a healthy way to relieve stress. Avoid discussing your feelings with people who make you feel worse.  Write. It may help to write about things that are bothering you. This helps you find out how much stress you feel and what is causing it. When you know this, you can find better ways to cope.  What can you do to prevent stress?  You might try some of these things to help prevent stress:  Manage your time. This helps you find time to do the things you want and need to do.  Get enough sleep. Your body recovers from the stresses of the day while you are sleeping.  Get support. Your family, friends, and community can make a difference in how you experience stress.  Limit your news feed. Avoid or limit time on social media or news that may make you feel stressed.  Do something active. Exercise or activity can help reduce stress. Walking is a great way to get started.  Where can you learn more?  Go to https://www.[employer].net/patiented  Enter N032 in the search box to learn more about \"Learning About Stress.\"  Current as of: October 24, 2023  Content Version: 14.3    2024 [employer].   Care instructions adapted under license by your healthcare professional. If you have questions about a medical condition or this instruction, always ask your healthcare professional. [employer] disclaims any warranty or liability for your use of this information.    Learning About Sleeping Well  What does sleeping well mean?     Sleeping well means getting enough sleep to feel good and stay healthy. How much sleep is enough varies among people.  The number of hours you sleep and how you feel when you wake up are both important. If " you do not feel refreshed, you probably need more sleep. Another sign of not getting enough sleep is feeling tired during the day.  Experts recommend that adults get at least 7 or more hours of sleep per day. Children and older adults need more sleep.  Why is getting enough sleep important?  Getting enough quality sleep is a basic part of good health. When your sleep suffers, your physical health, mood, and your thoughts can suffer too. You may find yourself feeling more grumpy or stressed. Not getting enough sleep also can lead to serious problems, including injury, accidents, anxiety, and depression.  What might cause poor sleeping?  Many things can cause sleep problems, including:  Changes to your sleep schedule.  Stress. Stress can be caused by fear about a single event, such as giving a speech. Or you may have ongoing stress, such as worry about work or school.  Depression, anxiety, and other mental or emotional conditions.  Changes in your sleep habits or surroundings. This includes changes that happen where you sleep, such as noise, light, or sleeping in a different bed. It also includes changes in your sleep pattern, such as having jet lag or working a late shift.  Health problems, such as pain, breathing problems, and restless legs syndrome.  Lack of regular exercise.  Using alcohol, nicotine, or caffeine before bed.  How can you help yourself?  Here are some tips that may help you sleep more soundly and wake up feeling more refreshed.  Your sleeping area   Use your bedroom only for sleeping and sex. A bit of light reading may help you fall asleep. But if it doesn't, do your reading elsewhere in the house. Try not to use your TV, computer, smartphone, or tablet while you are in bed.  Be sure your bed is big enough to stretch out comfortably, especially if you have a sleep partner.  Keep your bedroom quiet, dark, and cool. Use curtains, blinds, or a sleep mask to block out light. To block out noise, use  "earplugs, soothing music, or a \"white noise\" machine.  Your evening and bedtime routine   Create a relaxing bedtime routine. You might want to take a warm shower or bath, or listen to soothing music.  Go to bed at the same time every night. And get up at the same time every morning, even if you feel tired.  What to avoid   Limit caffeine (coffee, tea, caffeinated sodas) during the day, and don't have any for at least 6 hours before bedtime.  Avoid drinking alcohol before bedtime. Alcohol can cause you to wake up more often during the night.  Try not to smoke or use tobacco, especially in the evening. Nicotine can keep you awake.  Limit naps during the day, especially close to bedtime.  Avoid lying in bed awake for too long. If you can't fall asleep or if you wake up in the middle of the night and can't get back to sleep within about 20 minutes, get out of bed and go to another room until you feel sleepy.  Avoid taking medicine right before bed that may keep you awake or make you feel hyper or energized. Your doctor can tell you if your medicine may do this and if you can take it earlier in the day.  If you can't sleep   Imagine yourself in a peaceful, pleasant scene. Focus on the details and feelings of being in a place that is relaxing.  Get up and do a quiet or boring activity until you feel sleepy.  Avoid drinking any liquids before going to bed to help prevent waking up often to use the bathroom.  Where can you learn more?  Go to https://www.[employer].net/patiented  Enter J942 in the search box to learn more about \"Learning About Sleeping Well.\"  Current as of: July 31, 2024  Content Version: 14.3    2024 [employer].   Care instructions adapted under license by your healthcare professional. If you have questions about a medical condition or this instruction, always ask your healthcare professional. [employer] disclaims any warranty or liability for your use of this " information.    Bladder Training: Care Instructions  Your Care Instructions     Bladder training is used to treat urge incontinence and stress incontinence. Urge incontinence means that the need to urinate comes on so fast that you can't get to a toilet in time. Stress incontinence means that you leak urine because of pressure on your bladder. For example, it may happen when you laugh, cough, or lift something heavy.  Bladder training can increase how long you can wait before you have to urinate. It can also help your bladder hold more urine. And it can give you better control over the urge to urinate.  It is important to remember that bladder training takes a few weeks to a few months to make a difference. You may not see results right away, but don't give up.  Follow-up care is a key part of your treatment and safety. Be sure to make and go to all appointments, and call your doctor if you are having problems. It's also a good idea to know your test results and keep a list of the medicines you take.  How can you care for yourself at home?  Work with your doctor to come up with a bladder training program that is right for you. You may use one or more of the following methods.  Delayed urination  In the beginning, try to keep from urinating for 5 minutes after you first feel the need to go.  While you wait, take deep, slow breaths to relax. Kegel exercises can also help you delay the need to go to the bathroom.  After some practice, when you can easily wait 5 minutes to urinate, try to wait 10 minutes before you urinate.  Slowly increase the waiting period until you are able to control when you have to urinate.  Scheduled urination  Empty your bladder when you first wake up in the morning.  Schedule times throughout the day when you will urinate.  Start by going to the bathroom every hour, even if you don't need to go.  Slowly increase the time between trips to the bathroom.  When you have found a schedule that works  "well for you, keep doing it.  If you wake up during the night and have to urinate, do it. Apply your schedule to waking hours only.  Kegel exercises  These tighten and strengthen pelvic muscles, which can help you control the flow of urine. (If doing these exercises causes pain, stop doing them and talk with your doctor.) To do Kegel exercises:  Squeeze your muscles as if you were trying not to pass gas. Or squeeze your muscles as if you were stopping the flow of urine. Your belly, legs, and buttocks shouldn't move.  Hold the squeeze for 3 seconds, then relax for 5 to 10 seconds.  Start with 3 seconds, then add 1 second each week until you are able to squeeze for 10 seconds.  Repeat the exercise 10 times a session. Do 3 to 8 sessions a day.  When should you call for help?  Watch closely for changes in your health, and be sure to contact your doctor if:    Your incontinence is getting worse.     You do not get better as expected.   Where can you learn more?  Go to https://www.[employer].net/patiented  Enter V684 in the search box to learn more about \"Bladder Training: Care Instructions.\"  Current as of: April 30, 2024  Content Version: 14.3    2024 [employer].   Care instructions adapted under license by your healthcare professional. If you have questions about a medical condition or this instruction, always ask your healthcare professional. [employer] disclaims any warranty or liability for your use of this information.       "